# Patient Record
Sex: MALE | URBAN - METROPOLITAN AREA
[De-identification: names, ages, dates, MRNs, and addresses within clinical notes are randomized per-mention and may not be internally consistent; named-entity substitution may affect disease eponyms.]

---

## 2021-03-22 ENCOUNTER — APPOINTMENT (RX ONLY)
Dept: URBAN - METROPOLITAN AREA CLINIC 88 | Facility: CLINIC | Age: 76
Setting detail: DERMATOLOGY
End: 2021-03-22

## 2021-03-22 DIAGNOSIS — L91.8 OTHER HYPERTROPHIC DISORDERS OF THE SKIN: ICD-10-CM

## 2021-03-22 DIAGNOSIS — L85.3 XEROSIS CUTIS: ICD-10-CM

## 2021-03-22 PROCEDURE — 99203 OFFICE O/P NEW LOW 30 MIN: CPT

## 2021-03-22 PROCEDURE — ? PRESCRIPTION

## 2021-03-22 PROCEDURE — ? TREATMENT REGIMEN

## 2021-03-22 PROCEDURE — ? COUNSELING

## 2021-03-22 PROCEDURE — ? OTHER

## 2021-03-22 RX ORDER — CLOBETASOL PROPIONATE 0.5 MG/G
CREAM TOPICAL
Qty: 1 | Refills: 2 | Status: ERX | COMMUNITY
Start: 2021-03-22

## 2021-03-22 RX ADMIN — CLOBETASOL PROPIONATE: 0.5 CREAM TOPICAL at 00:00

## 2021-03-22 ASSESSMENT — SEVERITY ASSESSMENT: SEVERITY: MILD TO MODERATE

## 2021-03-22 NOTE — PROCEDURE: OTHER
Render Risk Assessment In Note?: no
Detail Level: Detailed
Note Text (......Xxx Chief Complaint.): This diagnosis correlates with the
Other (Free Text): Removed with scissors

## 2021-03-22 NOTE — PROCEDURE: TREATMENT REGIMEN
Action 4: Continue
Other Instructions: OTC Cerave cream
Show [place] Line: Yes
Treatment 1: Triamcinolone 0.1% cream
Detail Level: Zone

## 2021-07-29 ENCOUNTER — RX ONLY (OUTPATIENT)
Age: 76
Setting detail: RX ONLY
End: 2021-07-29

## 2021-07-29 RX ORDER — CLOBETASOL PROPIONATE 0.5 MG/G
CREAM TOPICAL
Qty: 1 | Refills: 2 | Status: ERX | COMMUNITY
Start: 2021-07-29